# Patient Record
(demographics unavailable — no encounter records)

---

## 2019-01-01 NOTE — NEWBORN INFANT-DISCHARGE
[facility]ant Discharge


Subjective/Events-Last Exam


Infant feeding fairly well by breast.  Mother reports she did have significant 1

bottle.  She will look into breast pumping if infant does not latch on right 

away.


Date Patient Was Seen:  Aug 16, 2019


Time Patient Was Seen:  07:15





Condition/Feeding


 Feeding Method:  Breast Milk-Exclusive





Discharge Examination


Level of Alertness:  Alert


Activity/State:  Active Alert


Head Circumference:  13.50


Fontanelles:  Soft


Anterior [place]cript[place]:  WNL


Cephalohematoma:  No


Sclera Description:  Clear


Ears:  Normal


Mouth, Nose, Eyes:  Hard & Soft Palate Intact


Neck:  Head Mobile, Clavicles Intact


Chest Circumference:  12.75


Cardiovascular:  Regular Rhythm


Respiratory:  Regular


Breath Sounds:  Clear


Caput Succedaneum:  No


Abdomen:  Soft


Abdomen Circumference:  12.25


Genitalia:  Appear Normal


Back:  Spine Closed


Hips:  WNL


Movement:  Symmetric-Body


Muscle Tone:  Active


Extremities:  5 digits present on each extremity





Weight/Height


Height (Inches):  20.00


Height (Calculated Centimeters:  50.662191


Weight (Pounds):  6


Weight (Ounces):  13.2


Weight (Calculated Kilograms):  3.098086


Weight (Calculated Grams):  3095.768





Vital Signs/Labs/SS


Vital Signs





Vital Signs








  Date Time  Temp Pulse Resp B/P (MAP) Pulse Ox O2 Delivery O2 Flow Rate FiO2


 


8/15/19 22:10 98.4 136 52     


 


8/15/19 18:20 97.6 148 52  99   


 


8/15/19 17:57 97.8 117 36  99   


 


8/15/19 14:43 97.9 128 40     


 


8/15/19 13:50 98.0 150 45     


 


8/15/19 13:20 97.4 144 40     


 


8/15/19 12:37 97.4 144 60  99   








Labs


Laboratory Tests


19 00:51: 


Total Bilirubin 3.5L, Direct Bilirubin 0.3, Indirect Bilirubin 3.2





Discharge Diagnosis/Plan


Discharge Diagnosis/Impression:  Birth (), Infant (female), Living, Term 

(39w)


Plan


1.  Discharged to home in the afternoon of 2019 with parents


-She will follow-up with Dr. Johnston in one week.  For now mother will continue 

with breast-feeding.  She will encourage feeding actually from the breast as 

opposed breast pumping.











EMMA JOHNSTON MD              Aug 16, 2019 07:52

## 2019-01-01 NOTE — NUR
of viable female infant by . infant's oral airway suctioned with bulb syringe 
per Dr prior to delivery of shoulders.  infant placed on mother's abd for initial bonding. 
dried and stimulated by this RN.  lusty cry noted. suctioned prn with bulb syringe.  
stockinet hat applied.

1219- cord clamped x2 by   cut by BAKARI.  wet linens removed. infant remains on mother's 
abd.  

1221- EES ointment applied OU.

1222- Vitamin K 0.5ml IM given in Rt.AT

1225- infant transported to radiant warmer per this RN.  infant weighed 6lb. 15oz.  3160 gm. 
 measured 20 inches long. 

1228- measurements taken.  

1231- #[id number] ID bracelets applied to Lt.wrist/ankle.

1233- footprints taken.

1237- vs taken, see intervention for further.

1241- infant double wrapped in receiving blankets.  hat on.  placed in FOB's arms.

1320- infant breast feeding. vs taken.

## 2019-01-01 NOTE — NUR
initial bath given under radiant warmer.  lotion applied. dressed & diapered. stockinet hat 
applied.

## 2019-01-01 NOTE — NEWBORN INFANT H&P-ADMISSION
[facility] Infant Record


Exam Date & Time


Date seen by provider:  Aug 15, 2019


Time seen by provider:  12:35





Provider


PCP


Emma Johnston MD





Delivery Assessment


Expected Date of Delivery:  Aug 22, 2019


Hx :  2


Hx Para:  2


Gestational Age in Weeks:  39


Gestational Age in Days:  0


Amniotic Membrane Rupture Time:  07:10


Delivery Date:  Aug 15, 2019


Delivery Time:  12:17


Condition of Infant:  Living


Infant Delivery Method:  Spontaneous Vaginal


Operative Indications (Ces[place]:  N/A-Vaginal Delivery


Anesthesia Type:  Epidural


Prenatal Events:  Routine Prenatal care


Intrapartal Events:  None


Gender:  Female


Viability:  Living





Mother's Group Strep


Mother's Group B Strep:  Negative





Maternal Labs


Hep B:  Negative


Rubella:  Immune





Apgar Score


Apgar Score at 1 Minute:  9


Apgar Score at 5 Minutes:  9





Condition/Feeding


Benefits of breastfeeding discussed with mother.


 Feeding Method:  Breast Milk-Exclusive


Gestation:  Single





Admission Examination


Level of Alertness:  Alert


Activity/State:  Active Alert


Skin:  Vernix


Fontanelles:  Soft


Anterior [place] Descriptio:  WNL


Cephalohematoma:  No


Sclera Description:  Clear


Ears:  Normal


Mouth, Nose, Eyes:  Hard & Soft Palate Intact


Neck:  Head Mobile, Clavicles Intact


Cardiovascular:  Regular Rhythm


Respiratory:  Regular


Breath Sounds:  Clear


Caput Succedaneum:  No


Abdomen:  Soft


Genitalia:  Appear Normal


Back:  Spine Closed


Hips:  WNL


Movement:  Symmetric-Body


Muscle Tone:  Active


Extremities:  5 digits present on each extremity





Weight/Height


Weight (Pounds):  6


Weight (Ounces):  15





Impression on Admission


Impression on Admission:  Birth (), Infant (female), Living, Term (39w)





Progress/Plan/Problem List


Progress/Plan


-admit to level 1 nursery


-infant to 











EMMA JOHNSTON MD              Aug 15, 2019 12:54

## 2019-01-01 NOTE — DISCHARGE INST-NURSERY
Discharge Inst-Nursery


Reconcile Patient Problems


Problems Reviewed?:  Yes





Instructions/Follow Up


Patient Instructions/Follow Up:  


With Dr. Johnston in one week





Activity


Avoid ALL Tobacco Products:  Second Hand Smoke





Diet


Pediatric Feeding Method:  Breast





Symptoms Report to Physician


[facility]  


Poor feeding or poor urine output, fever greater than 100.5.


Parent Questions Call:  Call your physician


For Problems/Questions:  Contact Your Physician











EMMA JOHNSTON MD              Aug 16, 2019 07:50

## 2019-01-01 NOTE — NUR
infant into nursery.  placed under radiant warmer.  vs taken. gestational age assessment 
completed.

## 2019-01-01 NOTE — NUR
Infant discharged at this time in an appropriate rear-facing infant car seat and accompanied 
down to awaiting private vehicle by GAYLE [place] [facility]. No signs or symptoms of distress noted.

## 2019-01-01 NOTE — NUR
Discharge instructions reviewed with parents both written and verbally. Mom verbalizes 
understanding and questions answered. Bracelet check completed and HUGs band removed.

## 2019-01-01 NOTE — NUR
Infant back out to Mom's room via open air crib. Plan of care reviewed with Mom. Mom 
verbalizes understanding and questions answered.